# Patient Record
Sex: MALE | Race: OTHER | Employment: UNEMPLOYED | URBAN - METROPOLITAN AREA
[De-identification: names, ages, dates, MRNs, and addresses within clinical notes are randomized per-mention and may not be internally consistent; named-entity substitution may affect disease eponyms.]

---

## 2019-06-05 ENCOUNTER — HOSPITAL ENCOUNTER (EMERGENCY)
Age: 39
Discharge: HOME | End: 2019-06-05

## 2019-06-05 VITALS
TEMPERATURE: 98.7 F | WEIGHT: 260 LBS | HEART RATE: 89 BPM | BODY MASS INDEX: 37.22 KG/M2 | RESPIRATION RATE: 16 BRPM | SYSTOLIC BLOOD PRESSURE: 126 MMHG | HEIGHT: 70 IN | DIASTOLIC BLOOD PRESSURE: 91 MMHG | OXYGEN SATURATION: 95 %

## 2019-06-05 DIAGNOSIS — J06.9 ACUTE UPPER RESPIRATORY INFECTION: Primary | ICD-10-CM

## 2019-06-05 DIAGNOSIS — J02.9 ACUTE PHARYNGITIS, UNSPECIFIED ETIOLOGY: ICD-10-CM

## 2019-06-05 PROCEDURE — 6360000002 HC RX W HCPCS: Performed by: PHYSICIAN ASSISTANT

## 2019-06-05 PROCEDURE — 87430 STREP A AG IA: CPT

## 2019-06-05 PROCEDURE — 99283 EMERGENCY DEPT VISIT LOW MDM: CPT

## 2019-06-05 PROCEDURE — 87081 CULTURE SCREEN ONLY: CPT

## 2019-06-05 PROCEDURE — 6370000000 HC RX 637 (ALT 250 FOR IP): Performed by: PHYSICIAN ASSISTANT

## 2019-06-05 RX ORDER — BENZONATATE 100 MG/1
100 CAPSULE ORAL 3 TIMES DAILY PRN
Qty: 15 CAPSULE | Refills: 0 | Status: SHIPPED | OUTPATIENT
Start: 2019-06-05 | End: 2019-06-12

## 2019-06-05 RX ORDER — IBUPROFEN 600 MG/1
600 TABLET ORAL EVERY 6 HOURS PRN
Qty: 30 TABLET | Refills: 1 | Status: SHIPPED | OUTPATIENT
Start: 2019-06-05 | End: 2019-06-19

## 2019-06-05 RX ORDER — LIDOCAINE HYDROCHLORIDE 20 MG/ML
15 SOLUTION OROPHARYNGEAL PRN
Qty: 200 ML | Refills: 0 | Status: SHIPPED | OUTPATIENT
Start: 2019-06-05

## 2019-06-05 RX ORDER — LIDOCAINE HYDROCHLORIDE 20 MG/ML
15 SOLUTION OROPHARYNGEAL ONCE
Status: COMPLETED | OUTPATIENT
Start: 2019-06-05 | End: 2019-06-05

## 2019-06-05 RX ADMIN — IBUPROFEN 600 MG: 100 SUSPENSION ORAL at 12:13

## 2019-06-05 RX ADMIN — DEXAMETHASONE 10 MG: 2 TABLET ORAL at 12:13

## 2019-06-05 RX ADMIN — LIDOCAINE HYDROCHLORIDE 15 ML: 20 SOLUTION ORAL; TOPICAL at 12:12

## 2019-06-05 ASSESSMENT — PAIN DESCRIPTION - DESCRIPTORS: DESCRIPTORS: DISCOMFORT

## 2019-06-05 ASSESSMENT — PAIN DESCRIPTION - LOCATION: LOCATION: THROAT

## 2019-06-05 ASSESSMENT — PAIN SCALES - GENERAL
PAINLEVEL_OUTOF10: 7
PAINLEVEL_OUTOF10: 7

## 2019-06-05 ASSESSMENT — PAIN DESCRIPTION - PAIN TYPE: TYPE: ACUTE PAIN

## 2019-06-05 NOTE — ED PROVIDER NOTES
needs: Medical: None     Non-medical: None   Tobacco Use    Smoking status: Current Some Day Smoker     Packs/day: 0.50    Smokeless tobacco: Never Used   Substance and Sexual Activity    Alcohol use: Not Currently    Drug use: None    Sexual activity: None   Lifestyle    Physical activity:     Days per week: None     Minutes per session: None    Stress: None   Relationships    Social connections:     Talks on phone: None     Gets together: None     Attends Rastafari service: None     Active member of club or organization: None     Attends meetings of clubs or organizations: None     Relationship status: None    Intimate partner violence:     Fear of current or ex partner: None     Emotionally abused: None     Physically abused: None     Forced sexual activity: None   Other Topics Concern    None   Social History Narrative    None       PHYSICAL EXAM    VITAL SIGNS: BP (!) 126/91   Pulse 89   Temp 98.7 °F (37.1 °C) (Oral)   Resp 16   Ht 5' 10\" (1.778 m)   Wt 260 lb (117.9 kg)   SpO2 95%   BMI 37.31 kg/m²   Constitutional:  Well developed, well nourished, no acute distress, non-toxic appearance     HEENT:        - Normocephalic, atraumatic       - Frontal/Maxillary sinuses NONtender to percussion. Eyes:    - PERRL, EOM intact, conjunctiva normal, sclera non-icteric       Ears:    -  No auricular redness or induration    -  External auditory canals clear    - TMs pearly grey and translucent without discharge, fluid, or bulging.    - Nasal passages with mildly erythematous turbinates. No massess. Oropharynx:    - Oropharynx mildly erythematous without tonsillar hypertrophy or exudate.   - No midline shift of the uvula, no trismus, no facial swelling   - No inspiratory stridor. Tolerating secretions. No hot potato voice         Neck/Lymphatics:    - Supple neck without meningismus   -  No swollen lymph nodes.     Respiratory:  Clear to auscultation bilateral lung fields, no wheezes/rales/rhonchi. No retractions, no accessory muscle use  Cardiovascular:  Normal rate, normal rhythm   GI:  Soft, no abdominal tenderness, no guarding. Musculoskeletal:  No obvious deficits, no edema   Integument:  Skin pink, warn, dry, intact. No rash of scarletiniform appearance     Neurologic: Awake alert and oriented, and no slurred speech, no tremors or ataxia, or athetotic movements noted        LABS:  Results for orders placed or performed during the hospital encounter of 06/05/19   Strep Screen Group A Throat   Result Value Ref Range    Specimen THROAT     Special Requests NONE     Strep A Direct Screen NEGATIVE        ED COURSE & MEDICAL DECISION MAKING       Vital signs and nursing notes reviewed during ED course. I have independently evaluated this patient . Supervising MD  - Dr. Reva Moses - present in the Emergency Department, available for consultation, throughout entirety of  patient care. All pertinent Lab data and radiographic results reviewed with patient at bedside. The patient and/or the family were informed of the results of any tests/labs/imaging, the treatment plan, and time was allotted to answer questions. Differential Diagnoses:  Acute Bronchitis, Pneumonia, Airway Obstruction, Upper Respiratory Viral infection, Sinusitis, Pharyngitis, Lisa-Tonsillar Abscess,      Clinical  IMPRESSION    1. Acute upper respiratory infection    2. Acute pharyngitis, unspecified etiology        Patient presents with generalized body aches, sore throat and nonproductive cough. On exam, well-appearing nontoxic 81-year-old male, afebrile and in no acute respiratory distress. Tolerating oral secretions. Posterior oropharynx is patent, mildly injected without exudates. No inspiratory stridor. Lungs are clear to auscultation. No rash. Patient is given oral Decadron, viscous lidocaine and Motrin. Rapid strep is negative.   Discussed the patient likely viral etiology for his pharyngitis/URI complaints. Educated antibiotics are not emergently indicated this time given the length of symptoms with a negative strep test.  Does not meet [facility] criteria for empiric oral antibiotic treatment at this time so we'll await strep culture results prior to additional antibiotics. Low clinical suspicion for peritonsillar abscess/retropharyngeal abscess, epiglottitis, deep neck space infection, meningitis/encephalitis, or bacteremia. Patient is discharged stable condition with Motrin, viscous lidocaine and antitussive medication. Encourage rest, pushing clear fluids. Encouraged a soft food diet with warm salt water gargles. Patient does not have PCP so I have given him/her doctor of the day contact information and encourage him/her to establish care and followup in the next 1-2 days. Return warnings back to the ED per discussed for any new or worsening symptoms/.  Patient states that he is a diabetic, educated that he may see some transient elevations of his home blood glucose checks after taking oral Decadron today and will need to notify PCP during next hemoglobin A1c check of recent steroid use. Diagnosis and plan discussed in detail with patient who understands and agrees. Patient agrees to return emergency department if symptoms worsen or any new symptoms develop. Comment: Please note this report has been produced using speech recognition software and may contain errors related to that system including errors in grammar, punctuation, and spelling, as well as words and phrases that may be inappropriate. If there are any questions or concerns please feel free to contact the dictating provider for clarification.         Maxwell Fermin PA-C  06/05/19 41 Daniel Pereira PA-C  06/05/19 1222

## 2019-06-05 NOTE — LETTER
[facility] Emergency Department  [id number]  Phone: [phone]  Fax: [phone]               June 5, 2019    Patient: Rowdy Kruse   YOB: 1980   Date of Visit: 6/5/2019       To Whom It May Concern:    Rowdy Kruse was seen and treated in our emergency department on 6/5/2019. He may return on 6/6/2019.       Sincerely,       Venkatesh Torres RN         Signature:__________________________________

## 2019-06-07 LAB
CULTURE: ABNORMAL
Lab: ABNORMAL
SPECIMEN: ABNORMAL
STREP A DIRECT SCREEN: NEGATIVE

## 2019-06-19 ENCOUNTER — HOSPITAL ENCOUNTER (EMERGENCY)
Age: 39
Discharge: HOME | End: 2019-06-19

## 2019-06-19 VITALS
SYSTOLIC BLOOD PRESSURE: 117 MMHG | TEMPERATURE: 98.7 F | HEIGHT: 70 IN | DIASTOLIC BLOOD PRESSURE: 89 MMHG | BODY MASS INDEX: 35.79 KG/M2 | WEIGHT: 250 LBS | HEART RATE: 99 BPM | RESPIRATION RATE: 18 BRPM | OXYGEN SATURATION: 97 %

## 2019-06-19 DIAGNOSIS — L30.9 ACUTE DERMATITIS: ICD-10-CM

## 2019-06-19 DIAGNOSIS — G89.29 CHRONIC PAIN OF LEFT KNEE: Primary | ICD-10-CM

## 2019-06-19 DIAGNOSIS — M25.562 CHRONIC PAIN OF LEFT KNEE: Primary | ICD-10-CM

## 2019-06-19 PROCEDURE — 99282 EMERGENCY DEPT VISIT SF MDM: CPT

## 2019-06-19 RX ORDER — BETAMETHASONE DIPROPIONATE 0.05 %
OINTMENT (GRAM) TOPICAL
Qty: 50 G | Refills: 0 | Status: SHIPPED | OUTPATIENT
Start: 2019-06-19

## 2019-06-19 RX ORDER — NAPROXEN 500 MG/1
500 TABLET ORAL 2 TIMES DAILY PRN
Qty: 30 TABLET | Refills: 0 | Status: SHIPPED | OUTPATIENT
Start: 2019-06-19

## 2019-06-19 ASSESSMENT — PAIN SCALES - GENERAL: PAINLEVEL_OUTOF10: 8

## 2019-06-19 ASSESSMENT — PAIN DESCRIPTION - DESCRIPTORS: DESCRIPTORS: STABBING;SHARP;TIGHTNESS

## 2019-06-19 ASSESSMENT — PAIN DESCRIPTION - ONSET: ONSET: ON-GOING

## 2019-06-19 ASSESSMENT — PAIN DESCRIPTION - FREQUENCY: FREQUENCY: INTERMITTENT

## 2019-06-19 ASSESSMENT — PAIN DESCRIPTION - LOCATION: LOCATION: KNEE

## 2019-06-19 ASSESSMENT — PAIN DESCRIPTION - ORIENTATION: ORIENTATION: LEFT

## 2019-06-19 ASSESSMENT — PAIN DESCRIPTION - PAIN TYPE: TYPE: ACUTE PAIN;CHRONIC PAIN

## 2019-06-20 NOTE — ED TRIAGE NOTES
Pt c/o acute/ chronic Lt knee pain that has increased in pain for the past week. Pt also c/o rash on bilateral forearms x4 days.
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
